# Patient Record
Sex: MALE | URBAN - METROPOLITAN AREA
[De-identification: names, ages, dates, MRNs, and addresses within clinical notes are randomized per-mention and may not be internally consistent; named-entity substitution may affect disease eponyms.]

---

## 2024-09-04 ENCOUNTER — TELEPHONE (OUTPATIENT)
Dept: UROLOGY | Facility: CLINIC | Age: 64
End: 2024-09-04
Payer: COMMERCIAL

## 2024-09-04 NOTE — TELEPHONE ENCOUNTER
I called patient and was unable to reach him to verify prescription instructions/dosage. I left a detailed message for patient to give the office a call back.     ----- Message from Roxann Stewart sent at 9/4/2024  8:19 AM CDT -----  Regarding: refill request  Contact: [phone]  Is this a Refill or New Rx: REFILL         Rx Name and Strength: [id number] mg         Preferred Pharmacy with phone number: [email or web address] [address], [place] [phone]        Communication Preference: [phone]        Additional Information: Pt has seen provider at a different location , please call to advise if refill request can be filled

## 2024-09-10 ENCOUNTER — TELEPHONE (OUTPATIENT)
Dept: UROLOGY | Facility: CLINIC | Age: 64
End: 2024-09-10
Payer: COMMERCIAL

## 2024-09-10 NOTE — TELEPHONE ENCOUNTER
LVM for patient to call back to reschedule appointment for 09/11/2024 due to weather.    Geri Simms LPN